# Patient Record
Sex: FEMALE | Race: WHITE | NOT HISPANIC OR LATINO | Employment: STUDENT | URBAN - METROPOLITAN AREA
[De-identification: names, ages, dates, MRNs, and addresses within clinical notes are randomized per-mention and may not be internally consistent; named-entity substitution may affect disease eponyms.]

---
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
## 2024-09-23 ENCOUNTER — OFFICE VISIT (OUTPATIENT)
Dept: URGENT CARE | Facility: CLINIC | Age: 19
End: 2024-09-23
Payer: COMMERCIAL

## 2024-09-23 VITALS
HEART RATE: 103 BPM | RESPIRATION RATE: 19 BRPM | WEIGHT: 178.56 LBS | BODY MASS INDEX: 26.45 KG/M2 | HEIGHT: 69 IN | OXYGEN SATURATION: 99 % | DIASTOLIC BLOOD PRESSURE: 71 MMHG | SYSTOLIC BLOOD PRESSURE: 117 MMHG | TEMPERATURE: 100 F

## 2024-09-23 DIAGNOSIS — R51.9 INTRACTABLE HEADACHE, UNSPECIFIED CHRONICITY PATTERN, UNSPECIFIED HEADACHE TYPE: ICD-10-CM

## 2024-09-23 DIAGNOSIS — J02.9 SORE THROAT: ICD-10-CM

## 2024-09-23 DIAGNOSIS — U07.1 COVID-19: Primary | ICD-10-CM

## 2024-09-23 LAB
CTP QC/QA: YES
SARS-COV-2 AG RESP QL IA.RAPID: POSITIVE

## 2024-09-23 PROCEDURE — 87811 SARS-COV-2 COVID19 W/OPTIC: CPT | Mod: QW,S$GLB,, | Performed by: NURSE PRACTITIONER

## 2024-09-23 PROCEDURE — 99213 OFFICE O/P EST LOW 20 MIN: CPT | Mod: S$GLB,,, | Performed by: NURSE PRACTITIONER

## 2024-09-23 RX ORDER — ETONOGESTREL AND ETHINYL ESTRADIOL VAGINAL RING .015; .12 MG/D; MG/D
1 RING VAGINAL
COMMUNITY
Start: 2024-09-04

## 2024-09-23 NOTE — LETTER
September 23, 2024    Elizabeth Hamilton  [address]  [place] [address]             Urgent Care - [facility]  Urgent Care  [address] [facility] [address]  Phone: [phone]  Fax: [phone]   September 23, 2024     Patient: Elizabeth Hamilton   YOB: 2005   Date of Visit: 9/23/2024       To Whom it May Concern:    Elizabeth Hamilton was seen in my clinic on 9/23/2024. She may return to school on 09/25/24 .    Please excuse her from any classes or work missed. Student tested positive for COVID today in clinic.     If you have any questions or concerns, please don't hesitate to call.    Sincerely,         Keya Sterling, NP

## 2024-09-23 NOTE — PATIENT INSTRUCTIONS
Drink plenty of fluids  Rest.   If you have fever you may return to work or school when you are fever free for 24 hours without using fever reducing medication.  Elevate head of bed when sleeping, use a humidifier (or a steamy shower) and use normal saline in the nasal passages to help with nasal congestion and cough.   For sore throat- avoid acidic/spicy foods   Wear a mask around others may reduce the spread of infections to others for 10 days   Wash hands frequently or use hand     Medications:  Fever and pain Ibuprofen (Advil or Motrin) and/or Acetaminophen (Tylenol) please read the packages for instructions  Cough and Congestion Guaifenesin (Mucinex) is an expectorant, Dextromethropan (DM) is a cough suppressant, or cough syrups of your choice.  If you were prescribed a prescription cough medication today only use as directed. Flonase (Fluticasone) nasal spray. One set in the morning and one set at night.  Sore throat  Cepacol lozenges, Chloraseptic spray, warm salt water gargles  Runny nose/Allergy symptoms  Allegra      The cough may linger for weeks.  Cough is our bodies defense mechanism to move mucus around to prevent us from getting pneumonia.  We can't totally take the cough away.       Follow up if:  Symptoms not improved in 14 days  Fever for longer than 3 days  Cough last longer than 10 days  Increased tiredness or weakness  If you are having difficulty breathing.  (If severe call 911 or go to nearest ER)

## 2024-09-23 NOTE — PROGRESS NOTES
"Subjective:      Patient ID: Elizabeth Hamilton is a 19 y.o. female.    Vitals:  height is 5' 9" (1.753 m) and weight is 81 kg (178 lb 9.2 oz). Her oral temperature is 99.5 °F (37.5 °C). Her blood pressure is 117/71 and her pulse is 103. Her respiration is 19 and oxygen saturation is 99%.     Chief Complaint: Sore Throat    This is a 19 y.o. female who presents today with a chief complaint of sore throat, nasal congestion and dizziness that started this morning. Pt states she has been taking tylenol.    Sore Throat   This is a new problem. The current episode started today. The problem has been unchanged. There has been no fever. The pain is at a severity of 3/10. The pain is mild. Associated symptoms include congestion, headaches and trouble swallowing. She has had no exposure to strep. She has tried acetaminophen for the symptoms. The treatment provided mild relief.       HENT:  Positive for congestion, sore throat and trouble swallowing.    Neurological:  Positive for headaches.      Objective:     Physical Exam   Constitutional: She is oriented to person, place, and time.  Non-toxic appearance. She does not appear ill. No distress.   HENT:   Head: Normocephalic and atraumatic.   Ears:   Right Ear: Tympanic membrane normal.   Left Ear: Tympanic membrane normal.   Nose: No congestion.   Mouth/Throat: Mucous membranes are moist. Oropharynx is clear.   Eyes: Conjunctivae are normal. Pupils are equal, round, and reactive to light. Extraocular movement intact   Neck:      Comments: NTTP    Cardiovascular: Normal rate, regular rhythm, normal heart sounds and normal pulses.   Pulmonary/Chest: Effort normal and breath sounds normal. No respiratory distress. She has no wheezes.   Abdominal: Normal appearance.   Musculoskeletal: Normal range of motion.         General: Normal range of motion.   Lymphadenopathy:     She has cervical adenopathy.        Right cervical: Superficial cervical adenopathy present.        Left " cervical: Superficial cervical adenopathy present.   Neurological: no focal deficit. She is alert and oriented to person, place, and time.   Skin: Skin is warm and not diaphoretic.   Psychiatric: Her behavior is normal. Mood normal.   Nursing note and vitals reviewed.    Results for orders placed or performed in visit on 09/23/24   SARS Coronavirus 2 Antigen, POCT Manual Read   Result Value Ref Range    SARS Coronavirus 2 Antigen Positive (A) Negative     Acceptable Yes        Assessment:     1. COVID-19    2. Sore throat    3. Intractable headache, unspecified chronicity pattern, unspecified headache type        Plan:       COVID-19    Sore throat  -     SARS Coronavirus 2 Antigen, POCT Manual Read    Intractable headache, unspecified chronicity pattern, unspecified headache type

## 2025-03-14 ENCOUNTER — OFFICE VISIT (OUTPATIENT)
Dept: URGENT CARE | Facility: CLINIC | Age: 20
End: 2025-03-14
Payer: COMMERCIAL

## 2025-03-14 VITALS
DIASTOLIC BLOOD PRESSURE: 69 MMHG | TEMPERATURE: 99 F | BODY MASS INDEX: 24.18 KG/M2 | RESPIRATION RATE: 19 BRPM | HEART RATE: 84 BPM | SYSTOLIC BLOOD PRESSURE: 107 MMHG | WEIGHT: 163.25 LBS | HEIGHT: 69 IN | OXYGEN SATURATION: 97 %

## 2025-03-14 DIAGNOSIS — K08.89 PAIN, DENTAL: Primary | ICD-10-CM

## 2025-03-14 RX ORDER — DOXYCYCLINE 100 MG/1
100 CAPSULE ORAL EVERY 12 HOURS
Qty: 10 CAPSULE | Refills: 0 | Status: SHIPPED | OUTPATIENT
Start: 2025-03-14 | End: 2025-03-19

## 2025-03-14 NOTE — LETTER
March 14, 2025    Elizabeth Hamilton  [address]  [place] [address]             Urgent Care - [facility]  Urgent Care  [address] [address] [facility] [address]  Phone: [phone]  Fax: [phone]   March 14, 2025     Patient: Elizabeth Hamilton   YOB: 2005   Date of Visit: 3/14/2025       To Whom it May Concern:    Elizabeth Hamilton was seen in my clinic on 3/14/2025. She may return to school on 03/17/25 .    Please excuse her from any classes or work missed.    If you have any questions or concerns, please don't hesitate to call.    Sincerely,         Keya Sterling, NP

## 2025-03-14 NOTE — PROGRESS NOTES
"Subjective:      Patient ID: Elizabeth Hamilton is a 19 y.o. female.    Vitals:  height is 5' 9" (1.753 m) and weight is 74 kg (163 lb 4 oz). Her oral temperature is 98.7 °F (37.1 °C). Her blood pressure is 107/69 and her pulse is 84. Her respiration is 19 and oxygen saturation is 97%.     Chief Complaint: Dental Pain    This is a 19 y.o. female who presents today with a chief complaint of mouth pain on the right side. Pt states it is hard to chew on the right side.    Dental Pain   The dental pain is located in She's tooth (teeth). This is a new problem. The current episode started in the past 7 days. The problem has been rapidly worsening. The pain is at a severity of 4/10. The pain is moderate. She has tried NSAIDs for the symptoms.     ROS   Objective:     Physical Exam   Constitutional: She is oriented to person, place, and time.  Non-toxic appearance. She does not appear ill. No distress.   HENT:   Head: Normocephalic and atraumatic.   Nose: No rhinorrhea or congestion.   Mouth/Throat: Mucous membranes are moist. Posterior oropharyngeal erythema present. Oropharynx is clear.       Pt's bottom right wisdom tooth is about 3/4 broken through the skin.  The back 1/4 is still covered with tissue.  The lateral gum is pulled away from the last tooth and the wisdom tooth and is extremely red, TTP.       Comments: Pt's bottom right wisdom tooth is about 3/4 broken through the skin.  The back 1/4 is still covered with tissue.  The lateral gum is pulled away from the last tooth and the wisdom tooth and is extremely red, TTP.   Eyes: Conjunctivae are normal. Pupils are equal, round, and reactive to light. Extraocular movement intact   Neck:      Comments: Right shotty tonsillar node   Cardiovascular: Normal rate, regular rhythm, normal heart sounds and normal pulses.   Pulmonary/Chest: Effort normal and breath sounds normal. No respiratory distress. She has no wheezes.   Abdominal: Normal appearance.   Musculoskeletal: " Normal range of motion.         General: Normal range of motion.   Lymphadenopathy:     She has cervical adenopathy.        Right cervical: Superficial cervical adenopathy present.   Neurological: no focal deficit. She is alert and oriented to person, place, and time.   Skin: Skin is warm and not diaphoretic.   Psychiatric: Her behavior is normal. Mood normal.     Assessment:     1. Pain, dental        Plan:       Pain, dental  -     doxycycline (VIBRAMYCIN) 100 MG Cap; Take 1 capsule (100 mg total) by mouth every 12 (twelve) hours. for 5 days  Dispense: 10 capsule; Refill: 0    Pt is allergic to PCN.  States the last time she took it she felt some throat swelling and difficulty swallowing so I did not attempt a cephalosporin